# Patient Record
Sex: FEMALE | Race: WHITE | NOT HISPANIC OR LATINO | Employment: FULL TIME | URBAN - METROPOLITAN AREA
[De-identification: names, ages, dates, MRNs, and addresses within clinical notes are randomized per-mention and may not be internally consistent; named-entity substitution may affect disease eponyms.]

---

## 2020-08-20 ENCOUNTER — OFFICE VISIT (OUTPATIENT)
Dept: PODIATRY | Facility: CLINIC | Age: 55
End: 2020-08-20

## 2020-08-20 VITALS
BODY MASS INDEX: 22.99 KG/M2 | HEART RATE: 62 BPM | SYSTOLIC BLOOD PRESSURE: 130 MMHG | HEIGHT: 65 IN | DIASTOLIC BLOOD PRESSURE: 76 MMHG | WEIGHT: 138 LBS

## 2020-08-20 DIAGNOSIS — M79.674 GREAT TOE PAIN, RIGHT: Primary | ICD-10-CM

## 2020-08-20 DIAGNOSIS — L60.0 ONYCHOCRYPTOSIS: ICD-10-CM

## 2020-08-20 PROCEDURE — 11750 EXCISION NAIL&NAIL MATRIX: CPT | Performed by: PODIATRIST

## 2020-08-20 PROCEDURE — 99203 OFFICE O/P NEW LOW 30 MIN: CPT | Performed by: PODIATRIST

## 2020-08-20 RX ORDER — IBUPROFEN 400 MG/1
200 TABLET ORAL AS NEEDED
COMMUNITY

## 2020-08-20 RX ORDER — BUSPIRONE HYDROCHLORIDE 7.5 MG/1
20 TABLET ORAL
COMMUNITY
Start: 2018-09-24 | End: 2022-08-31

## 2020-08-20 NOTE — PROGRESS NOTES
08/20/2020  Foot and Ankle Surgery - New Patient   Provider: Dr. Moustapha Monae DPM  Location: [facility]    Subjective:  Aspen Way is a 55 y.o. female.     Chief Complaint   Patient presents with   • Right Foot - Nail Problem       HPI: Patient is a 55-year-old female that presents with issues involving her right great toenail.  She has been dealing with discomfort involving the medial border of the toenail as are intermittently noticed.  She states that discomfort is typically present with certain shoes and increased activity.  She has not had any significant redness or drainage.  She has dealt with intermittent issues similar to this for several years.  She has noticed gradual thickening of the toenail and incurvation involving the medial border.  She denies any injury to the toe.  She rates the symptoms a 7 out of 10 when noticed.    Allergies   Allergen Reactions   • Nitrofurantoin Dizziness   • Sulfa Antibiotics GI Intolerance       Past Medical History:   Diagnosis Date   • Kidney stone        Past Surgical History:   Procedure Laterality Date   • KIDNEY STONE SURGERY         Family History   Problem Relation Age of Onset   • Hypertension Mother        Social History     Socioeconomic History   • Marital status:      Spouse name: Not on file   • Number of children: Not on file   • Years of education: Not on file   • Highest education level: Not on file   Tobacco Use   • Smoking status: Never Smoker   Substance and Sexual Activity   • Alcohol use: No   • Drug use: Defer   • Sexual activity: Defer        Current Outpatient Medications on File Prior to Visit   Medication Sig Dispense Refill   • busPIRone (BUSPAR) 7.5 MG tablet 20 mg.     • ibuprofen (ADVIL,MOTRIN) 400 MG tablet Take 200 mg by mouth.       No current facility-administered medications on file prior to visit.        Review of Systems:  General: Denies fever, chills, fatigue, and weakness.  Eyes: Denies vision loss, blurry  "vision, and excessive redness.  ENT: Denies hearing issues and difficulty swallowing.  Cardiovascular: Denies palpitations, chest pain, or syncopal episodes.  Respiratory: Denies shortness of breath, wheezing, and coughing.  GI: Denies abdominal pain, nausea, and vomiting.   : Denies frequency, hematuria, and urgency.  Musculoskeletal: Denies muscle cramps, joint pains, and stiffness.  Derm: + Ingrown nail  Neuro: Denies headaches, numbness, loss of coordination, and tremors.  Psych: Denies anxiety and depression.  Endocrine: Denies temperature intolerance and changes in appetite.  Heme: Denies bleeding disorders or abnormal bruising.     Objective   /76   Pulse 62   Ht 165.1 cm (65\")   Wt 62.6 kg (138 lb)   BMI 22.96 kg/m²     Foot/Ankle Exam:       General:   Appearance: appears stated age and healthy    Orientation: AAOx3    Affect: appropriate    Gait: unimpaired      VASCULAR      Right Foot Vascularity   Normal vascular exam    Dorsalis pedis:  2+  Posterior tibial:  2+  Skin Temperature: warm    Edema Grading:  None  CFT:  < 3 seconds  Pedal Hair Growth:  Present  Varicosities: none        NEUROLOGIC     Right Foot Neurologic   Light touch sensation:  Normal  Hot/Cold sensation: normal    Achilles reflex:  2+     MUSCULOSKELETAL      Right Foot Musculoskeletal   Ecchymosis:  None  Tenderness: toe 1    Arch:  Normal     MUSCLE STRENGTH     Right Foot Muscle Strength   Normal strength    Foot dorsiflexion:  5  Foot plantar flexion:  5  Foot inversion:  5  Foot eversion:  5     DERMATOLOGIC     Right Foot Dermatologic   Skin: skin intact    Nails: abnormally thick, ingrown toenail and longitudinal ridges    Nails comment:  No significant erythema or drainage..  Discomfort with palpation to the medial border      Right Foot Additional Comments Moderate incurvation involving the medial border of the right great toe.  No signs of infection.  No gross deformity or instability.      Assessment/Plan "   Aspen was seen today for nail problem.    Diagnoses and all orders for this visit:    Great toe pain, right    Onychocryptosis      Patient presents with longstanding issues involving the right great toe.  Recently symptoms have progressed.  She complains of pain involving the medial border of the toe with increased activity and certain shoes.  After exam, findings are consistent with ingrown nail.  She has no signs of infection.  We did discuss treatment options including partial nail avulsion with chemical matrixectomy.  We did review the risks and benefits.  Procedure was performed without complication.  I have discussed the postprocedural instruction sheet.  She is to call with any issues or concerns.  I will see her as needed.    Partial Nail Avulsion with Chemical Matrixectomy: Right hallux    Informed consent was obtained prior to the procedure.  Right hallux was cleansed with alcohol and the digit was blocked in a ring block fashion utilizing 5 mL of 1% lidocaine plain.  A digital tourniquet was applied to the digit.  The medial border was lifted from the nail bed and nail margin with a Orlando.  An English Anvil was used to split the nail proximal to the eponychium extending into the nail matrix.  The offending nail margin was grasped with a hemostat and removed.  The nail border was explored with a curette to ensure adequate removal.  Matrixectomy was performed utilizing three 30 second applications of 89% phenol on a micro-tip applicator.  The area was then rinsed with alcohol to dilute the phenol. The nail fold and exposed nail bed were flushed with normal saline.  Antibiotic ointment followed by sterile compressive dressings were then applied.  The digital tourniquot was removed.  No excessive bleeding or complications were evident.  The patient tolerated procedure and local anesthetic well.      No orders of the defined types were placed in this encounter.       Note is dictated utilizing voice  recognition software. Unfortunately this leads to occasional typographical errors. I apologize in advance if the situation occurs. If questions occur please do not hesitate to call our office.

## 2020-08-20 NOTE — PATIENT INSTRUCTIONS
Ingrown Toenail  An ingrown toenail occurs when the corner or sides of a toenail grow into the surrounding skin. This causes discomfort and pain. The big toe is most commonly affected, but any of the toes can be affected. If an ingrown toenail is not treated, it can become infected.  What are the causes?  This condition may be caused by:  · Wearing shoes that are too small or tight.  · An injury, such as stubbing your toe or having your toe stepped on.  · Improper cutting or care of your toenails.  · Having nail or foot abnormalities that were present from birth (congenital abnormalities), such as having a nail that is too big for your toe.  What increases the risk?  The following factors may make you more likely to develop ingrown toenails:  · Age. Nails tend to get thicker with age, so ingrown nails are more common among older people.  · Cutting your toenails incorrectly, such as cutting them very short or cutting them unevenly.  An ingrown toenail is more likely to get infected if you have:  · Diabetes.  · Blood flow (circulation) problems.  What are the signs or symptoms?  Symptoms of an ingrown toenail may include:  · Pain, soreness, or tenderness.  · Redness.  · Swelling.  · Hardening of the skin that surrounds the toenail.  Signs that an ingrown toenail may be infected include:  · Fluid or pus.  · Symptoms that get worse instead of better.  How is this diagnosed?  An ingrown toenail may be diagnosed based on your medical history, your symptoms, and a physical exam. If you have fluid or blood coming from your toenail, a sample may be collected to test for the specific type of bacteria that is causing the infection.  How is this treated?  Treatment depends on how severe your ingrown toenail is. You may be able to care for your toenail at home.  · If you have an infection, you may be prescribed antibiotic medicines.  · If you have fluid or pus draining from your toenail, your health care provider may drain  it.  · If you have trouble walking, you may be given crutches to use.  · If you have a severe or infected ingrown toenail, you may need a procedure to remove part or all of the nail.  Follow these instructions at home:  Foot care    · Do not pick at your toenail or try to remove it yourself.  · Soak your foot in warm, soapy water. Do this for 20 minutes, 3 times a day, or as often as told by your health care provider. This helps to keep your toe clean and keep your skin soft.  · Wear shoes that fit well and are not too tight. Your health care provider may recommend that you wear open-toed shoes while you heal.  · Trim your toenails regularly and carefully. Cut your toenails straight across to prevent injury to the skin at the corners of the toenail. Do not cut your nails in a curved shape.  · Keep your feet clean and dry to help prevent infection.  Medicines  · Take over-the-counter and prescription medicines only as told by your health care provider.  · If you were prescribed an antibiotic, take it as told by your health care provider. Do not stop taking the antibiotic even if you start to feel better.  Activity  · Return to your normal activities as told by your health care provider. Ask your health care provider what activities are safe for you.  · Avoid activities that cause pain.  General instructions  · If your health care provider told you to use crutches to help you move around, use them as instructed.  · Keep all follow-up visits as told by your health care provider. This is important.  Contact a health care provider if:  · You have more redness, swelling, pain, or other symptoms that do not improve with treatment.  · You have fluid, blood, or pus coming from your toenail.  Get help right away if:  · You have a red streak on your skin that starts at your foot and spreads up your leg.  · You have a fever.  Summary  · An ingrown toenail occurs when the corner or sides of a toenail grow into the surrounding  skin. This causes discomfort and pain. The big toe is most commonly affected, but any of the toes can be affected.  · If an ingrown toenail is not treated, it can become infected.  · Fluid or pus draining from your toenail is a sign of infection. Your health care provider may need to drain it. You may be given antibiotics to treat the infection.  · Trimming your toenails regularly and properly can help you prevent an ingrown toenail.  This information is not intended to replace advice given to you by your health care provider. Make sure you discuss any questions you have with your health care provider.  Document Released: 12/15/2001 Document Revised: 04/10/2020 Document Reviewed: 09/05/2018  Elsevier Patient Education © 2020 [employer] Inc[employer]

## 2022-08-31 ENCOUNTER — OFFICE VISIT (OUTPATIENT)
Dept: ORTHOPEDIC SURGERY | Facility: CLINIC | Age: 57
End: 2022-08-31

## 2022-08-31 VITALS — WEIGHT: 139.8 LBS | HEART RATE: 63 BPM | BODY MASS INDEX: 23.29 KG/M2 | HEIGHT: 65 IN

## 2022-08-31 DIAGNOSIS — Z87.898 HISTORY OF PITUITARY TUMOR: ICD-10-CM

## 2022-08-31 DIAGNOSIS — Z87.442 HISTORY OF KIDNEY STONES: ICD-10-CM

## 2022-08-31 DIAGNOSIS — Z82.62 FAMILY HX OSTEOPOROSIS: ICD-10-CM

## 2022-08-31 DIAGNOSIS — M81.0 OSTEOPOROSIS WITHOUT CURRENT PATHOLOGICAL FRACTURE, UNSPECIFIED OSTEOPOROSIS TYPE: Primary | ICD-10-CM

## 2022-08-31 PROBLEM — R92.30 DENSE BREAST TISSUE ON MAMMOGRAM: Status: ACTIVE | Noted: 2019-11-14

## 2022-08-31 PROBLEM — R92.2 DENSE BREAST TISSUE ON MAMMOGRAM: Status: ACTIVE | Noted: 2019-11-14

## 2022-08-31 PROCEDURE — 99204 OFFICE O/P NEW MOD 45 MIN: CPT | Performed by: PHYSICIAN ASSISTANT

## 2022-08-31 RX ORDER — SIMETHICONE 125 MG
125 TABLET,CHEWABLE ORAL EVERY 6 HOURS PRN
COMMUNITY
End: 2022-12-05

## 2022-08-31 RX ORDER — MULTIVIT-MIN/IRON/FOLIC ACID/K 18-600-40
2000 CAPSULE ORAL DAILY
COMMUNITY

## 2022-08-31 NOTE — PATIENT INSTRUCTIONS
Osteoporosis    Osteoporosis is when the bones get thin and weak. This can cause your bones to break (fracture) more easily.  What are the causes?  The exact cause of this condition is not known.  What increases the risk?  Having family members with this condition.  Not eating enough healthy foods.  Taking certain medicines.  Being female.  Being age 50 or older.  Smoking or using other products that contain nicotine or tobacco, such as e-cigarettes or chewing tobacco.  Not exercising.  Being of  or  ancestry.  Having a small body frame.  What are the signs or symptoms?  A broken bone might be the first sign, especially if the break results from a fall or injury that usually would not cause a bone to break.  Other signs and symptoms include:  Pain in the neck or low back.  Being hunched over (stooped posture).  Getting shorter.  How is this treated?  Eating more foods with more calcium and vitamin D in them.  Doing exercises.  Stopping tobacco use.  Limiting how much alcohol you drink.  Taking medicines to slow bone loss or help make the bones stronger.  Taking supplements of calcium and vitamin D every day.  Taking medicines to replace chemicals in the body (hormone replacement medicines).  Monitoring your levels of calcium and vitamin D.  The goal of treatment is to strengthen your bones and lower your risk for a bone break.  Follow these instructions at home:  Eating and drinking  Eat plenty of calcium and vitamin D. These nutrients are good for your bones. Good sources of calcium and vitamin D include:  Some fish, such as salmon and tuna.  Foods that have calcium and vitamin D added to them (fortified foods), such as some breakfast cereals.  Egg yolks.  Cheese.  Liver.    Activity  Do exercises as told by your doctor. Ask your doctor what exercises are safe for you. You should do:  Exercises that make your muscles work to hold your body weight up (weight-bearing exercises). These include maryjane chi,  yoga, and walking.  Exercises to make your muscles stronger. One example is lifting weights.  Lifestyle  Do not drink alcohol if:  Your doctor tells you not to drink.  You are pregnant, may be pregnant, or are planning to become pregnant.  If you drink alcohol:  Limit how much you use to:  0-1 drink a day for women.  0-2 drinks a day for men.  Know how much alcohol is in your drink. In the [place] one drink equals one 12 oz bottle of beer (355 mL), one 5 oz glass of wine (148 mL), or one 1½ oz glass of hard liquor (44 mL).  Do not smoke or use any products that contain nicotine or tobacco. If you need help quitting, ask your doctor.  Preventing falls  Use tools to help you move around (mobility aids) as needed. These include canes, walkers, scooters, and crutches.  Keep rooms well-lit.  Put away things on the floor that could make you trip. These include cords and rugs.  Install safety rails on stairs. Install grab bars in bathrooms.  Use rubber mats in slippery areas, like bathrooms.  Wear shoes that:  Fit you well.  Support your feet.  Have closed toes.  Have rubber soles or low heels.  Tell your doctor about all of the medicines you are taking. Some medicines can make you more likely to fall.  General instructions  Take over-the-counter and prescription medicines only as told by your doctor.  Keep all follow-up visits.  Contact a doctor if:  You have not been tested (screened) for osteoporosis and you are:  A woman who is age 65 or older.  A man who is age 70 or older.  Get help right away if:  You fall.  You get hurt.  Summary  Osteoporosis happens when your bones get thin and weak.  Weak bones can break (fracture) more easily.  Eat plenty of calcium and vitamin D. These are good for your bones.  Tell your doctor about all of the medicines that you take.  This information is not intended to replace advice given to you by your health care provider. Make sure you discuss any questions you have with your health care  provider.  Document Revised: 06/03/2021 Document Reviewed: 06/03/2021  Elsevier Patient Education © 2021 Elsevier Inc.

## 2022-08-31 NOTE — PROGRESS NOTES
ORTHOPEDIC VISIT    Referring Provider: Kelly  Primary Care Provider: Richard Mendoza MD         Subjective:  Chief Complaint:  Chief Complaint   Patient presents with   • Osteoporosis     New Consult       HPI:  Aspen Way is a 57 y.o. female who presents for initial visit for osteoporosis.  The patient Complains of: back pain, loss of height and Joint pain.  And Denies: generalized bone pain and Any significant fractures over the age of 50.  Their risk factors include risk factors: low calcium intake, Elevated falls risk, Hypogonadism and Family history of osteoporosis.  The patient has the following Associated illnesses: Esophagitis, Gastritis and History of pituitary tumor.  Treatment to date has included Treatment to date: Vitamin D supplementation and Weightbearing exercise.  The patient has never taken any osteoporosis medications.  She currently does not take calcium but does take 2000 international units of D3 per day.  Fracture history over the age of 50 includes: None. Family history of osteoporosis includes: Mother, who was slightly kyphotic, and maternal grandmother, who was also kyphotic.  She reports menopause around the age of 53.  She does not smoke or drink alcohol.  Physical activity includes: Walking and recently started going to the [employer] / ?????.??.  She denies any history of steroid use, cancer, radiation treatment, heart attack, or stroke.  She has previously had 2 episodes of kidney stones, with the last one being approximately 8 years ago.  She does often have diarrhea.  She does have GERD and uses over-the-counter medications as needed.  She reports a history of pituitary tumor, which sounds like a prolactinoma.  She states that she took medicine for approximately 18 months and then no further treatment was needed.  She does not see a dentist regularly, but denies any mouth or jaw pain currently.  She has fallen in the last year, however does not feel unsteady with walking, and is not  "worried about falling.  She scored a 4 out of 14 on the STEADI risk for falling assessment.  She reports having multiple recent labs done with her PCP that are unavailable to me today.  Chart review completed today.  She did have a DEXA scan on 6/23/2022 at [facility] radiology, which revealed a T score of -3.0 in the spine, FRAX scores of 9.0% and 1.6%.  Referred for consultation by Dr. Mendoza.    STEADI Fall Risk Assessment was completed, and patient is at MODERATE risk for falls. Assessment completed on:8/31/2022    Past Medical History:   Diagnosis Date   • Kidney stone        Past Surgical History:   Procedure Laterality Date   • KIDNEY STONE SURGERY         Family History   Problem Relation Age of Onset   • Osteoporosis Mother    • Hypertension Mother    • Osteoporosis Maternal Grandmother        Social History     Occupational History   • Not on file   Tobacco Use   • Smoking status: Never Smoker   • Smokeless tobacco: Never Used   Vaping Use   • Vaping Use: Never used   Substance and Sexual Activity   • Alcohol use: No   • Drug use: Defer   • Sexual activity: Defer        Medications:    Current Outpatient Medications:   •  Cholecalciferol (Vitamin D) 50 MCG (2000 UT) capsule, Take 2,000 Units by mouth Daily. Not consistent per patient, Disp: , Rfl:   •  doxylamine (UNISOM) 25 MG tablet, Take 25 mg by mouth At Night As Needed for Sleep., Disp: , Rfl:   •  ibuprofen (ADVIL,MOTRIN) 400 MG tablet, Take 200 mg by mouth As Needed., Disp: , Rfl:   •  simethicone (MYLICON) 125 MG chewable tablet, Chew 125 mg Every 6 (Six) Hours As Needed., Disp: , Rfl:     Allergies:  Allergies   Allergen Reactions   • Nitrofurantoin Dizziness   • Sulfa Antibiotics GI Intolerance          Objective   Objective:    Pulse 63   Ht 165.1 cm (65\")   Wt 63.4 kg (139 lb 12.8 oz)   BMI 23.26 kg/m²     Physical Examination:  Well-nourished, well-developed individual in no acute distress, patient is alert and cooperative with the exam, " "appears to have normal mood and affect with a normal attention span and concentration, ambulating unassisted  normocephalic, atraumatic, extraocular movements intact, conjunctiva and sclera are clear, grossly normal hearing  no lymphadenopathy or thyromegaly  normal respiratory effort  abdomen is nontender, without guarding or rebound and nondistended  no gross joint abnormalities  no LE edema noted  skin intact without lesions or rashes visible         Imagin2022-DEXA scan at [facility] radiology, revealed a T score of -3.0 in the spine, -2.5 in the left femoral neck, -2.2 in the left total hip, with FRAX scores of 9.0% and 1.6%.            Assessment:  1. Osteoporosis without current pathological fracture, unspecified osteoporosis type    2. Family hx osteoporosis    3. History of pituitary tumor    4. History of kidney stones                 Plan:  I will request her most recent labs from her PCPs office for review.  She will likely need additional blood work to include bone turnover markers and a 24-hour urine for calcium and creatinine at the very least.  I do recommend 1200 mg of calcium daily, either through diet or supplementation, and she should continue her vitamin D.  According to her T score of -3.0, she is at very high risk for fracture.  She may be a candidate for anabolic treatment.  I will plan to see her back after the above has been completed, further recommendations will be pending.             GIDEON Goode  22  18:22 EDT    \"Please note that portions of this note were completed with a voice recognition program\".   "

## 2022-09-01 ENCOUNTER — TELEPHONE (OUTPATIENT)
Dept: ORTHOPEDIC SURGERY | Facility: CLINIC | Age: 57
End: 2022-09-01

## 2022-09-01 NOTE — TELEPHONE ENCOUNTER
----- Message from GIDEON Goode sent at 8/31/2022  6:33 PM EDT -----  Need recent labs from PCPs office

## 2022-09-01 NOTE — TELEPHONE ENCOUNTER
Call placed to Dr. Mendoza's office to request any recent labs done there faxing labs drawn on 8/12/2022.

## 2022-09-20 ENCOUNTER — TELEPHONE (OUTPATIENT)
Dept: ORTHOPEDIC SURGERY | Facility: CLINIC | Age: 57
End: 2022-09-20

## 2022-09-20 DIAGNOSIS — M81.0 OSTEOPOROSIS WITHOUT CURRENT PATHOLOGICAL FRACTURE, UNSPECIFIED OSTEOPOROSIS TYPE: Primary | ICD-10-CM

## 2022-09-20 DIAGNOSIS — Z87.442 HISTORY OF KIDNEY STONES: ICD-10-CM

## 2022-09-20 NOTE — TELEPHONE ENCOUNTER
Voicemail from patient checking to see if we had received the labs from her PCP office and if any additional labs were needed before she returned to discuss treatment options.    Call placed to patient, apologized for the delay and that I did have the labs from her PCP ready to be reviewed. Advised that however Farideh has been out sick so when she returns she will review the labs and we would be in touch regarding any additional labs needed. Okay per patient thanked us for calling.   Detail Level: Detailed Quality 110: Preventive Care And Screening: Influenza Immunization: Influenza immunization was not ordered or administered, reason not given Quality 130: Documentation Of Current Medications In The Medical Record: Current Medications Documented pt sitting at EOB with family present

## 2022-10-04 ENCOUNTER — LAB (OUTPATIENT)
Dept: LAB | Facility: HOSPITAL | Age: 57
End: 2022-10-04

## 2022-10-04 DIAGNOSIS — M81.0 OSTEOPOROSIS WITHOUT CURRENT PATHOLOGICAL FRACTURE, UNSPECIFIED OSTEOPOROSIS TYPE: ICD-10-CM

## 2022-10-04 DIAGNOSIS — Z87.442 HISTORY OF KIDNEY STONES: ICD-10-CM

## 2022-10-04 PROCEDURE — 36415 COLL VENOUS BLD VENIPUNCTURE: CPT

## 2022-10-04 PROCEDURE — 82523 COLLAGEN CROSSLINKS: CPT

## 2022-10-07 ENCOUNTER — LAB (OUTPATIENT)
Dept: LAB | Facility: HOSPITAL | Age: 57
End: 2022-10-07

## 2022-10-07 DIAGNOSIS — M81.0 OSTEOPOROSIS WITHOUT CURRENT PATHOLOGICAL FRACTURE, UNSPECIFIED OSTEOPOROSIS TYPE: ICD-10-CM

## 2022-10-07 DIAGNOSIS — Z87.442 HISTORY OF KIDNEY STONES: ICD-10-CM

## 2022-10-07 LAB
CALCIUM 24H UR-MCNC: 11.5 MG/DL
CALCIUM 24H UR-MRATE: 207 MG/24 HR (ref 100–300)
COLLAGEN CTX SERPL-MCNC: 454 PG/ML
COLLECT DURATION TIME UR: 24 HRS
COLLECT DURATION TIME UR: 24 HRS
CREAT UR-MCNC: 62.7 MG/DL
CREATINE 24H UR-MRATE: 1.13 G/24 HR (ref 0.7–1.6)
SPECIMEN VOL 24H UR: 1800 ML
SPECIMEN VOL 24H UR: 1800 ML

## 2022-10-07 PROCEDURE — 81050 URINALYSIS VOLUME MEASURE: CPT

## 2022-10-07 PROCEDURE — 82570 ASSAY OF URINE CREATININE: CPT

## 2022-10-07 PROCEDURE — 82340 ASSAY OF CALCIUM IN URINE: CPT

## 2022-10-14 LAB — PINP SER-MCNC: 83 UG/L

## 2022-11-07 ENCOUNTER — OFFICE VISIT (OUTPATIENT)
Dept: ORTHOPEDIC SURGERY | Facility: CLINIC | Age: 57
End: 2022-11-07

## 2022-11-07 ENCOUNTER — TELEPHONE (OUTPATIENT)
Dept: ORTHOPEDIC SURGERY | Facility: CLINIC | Age: 57
End: 2022-11-07

## 2022-11-07 VITALS — BODY MASS INDEX: 22.99 KG/M2 | HEIGHT: 65 IN | WEIGHT: 138 LBS | HEART RATE: 70 BPM

## 2022-11-07 DIAGNOSIS — M81.0 OSTEOPOROSIS WITHOUT CURRENT PATHOLOGICAL FRACTURE, UNSPECIFIED OSTEOPOROSIS TYPE: Primary | ICD-10-CM

## 2022-11-07 DIAGNOSIS — Z87.898 HISTORY OF PITUITARY TUMOR: ICD-10-CM

## 2022-11-07 DIAGNOSIS — Z82.62 FAMILY HX OSTEOPOROSIS: ICD-10-CM

## 2022-11-07 DIAGNOSIS — Z87.442 HISTORY OF KIDNEY STONES: ICD-10-CM

## 2022-11-07 PROCEDURE — 99214 OFFICE O/P EST MOD 30 MIN: CPT | Performed by: PHYSICIAN ASSISTANT

## 2022-11-07 RX ORDER — ABALOPARATIDE 2000 UG/ML
80 INJECTION, SOLUTION SUBCUTANEOUS DAILY
Qty: 1 ML | Refills: 0 | COMMUNITY
Start: 2022-11-07 | End: 2022-11-14 | Stop reason: SDUPTHER

## 2022-11-07 NOTE — PROGRESS NOTES
ORTHO FOLLOW UP       Subjective:    HPI:   Aspen Way is a 57 y.o. female who presents in follow-up for osteoporosis.  Recent lab work was reviewed with the patient today.  She is currently taking 700 mg of calcium +3000 international units of D3 per day.  From last OV:  Aspen Way is a 57 y.o. female who presents for initial visit for osteoporosis.  The patient Complains of: back pain, loss of height and Joint pain.  And Denies: generalized bone pain and Any significant fractures over the age of 50.  Their risk factors include risk factors: low calcium intake, Elevated falls risk, Hypogonadism and Family history of osteoporosis.  The patient has the following Associated illnesses: Esophagitis, Gastritis and History of pituitary tumor.  Treatment to date has included Treatment to date: Vitamin D supplementation and Weightbearing exercise.  The patient has never taken any osteoporosis medications.  She currently does not take calcium but does take 2000 international units of D3 per day.  Fracture history over the age of 50 includes: None. Family history of osteoporosis includes: Mother, who was slightly kyphotic, and maternal grandmother, who was also kyphotic.  She reports menopause around the age of 53.  She does not smoke or drink alcohol.  Physical activity includes: Walking [facility]  She denies any history of steroid use, cancer, radiation treatment, heart attack, or stroke.  She has previously had 2 episodes of kidney stones, with the last one being approximately 8 years ago.  She does often have diarrhea.  She does have GERD and uses over-the-counter medications as needed.  She reports a history of pituitary tumor, which sounds like a prolactinoma.  She states that she took medicine for approximately 18 months and then no further treatment was needed.  She does not see a dentist regularly, but denies any mouth or jaw pain currently.  She has fallen in the last year,  however does not feel unsteady with walking, and is not worried about falling.  She scored a 4 out of 14 on the STEADI risk for falling assessment.  She reports having multiple recent labs done with her PCP that are unavailable to me today.  Chart review completed today.  She did have a DEXA scan on 6/23/2022 at [facility] radiology, which revealed a T score of -3.0 in the spine, FRAX scores of 9.0% and 1.6%.  Referred for consultation by Dr. Mendoza.     STEADI Fall Risk Assessment was completed, and patient is at MODERATE risk for falls. Assessment completed on:8/31/2022      Past Medical History:   Diagnosis Date   • Kidney stone    • Osteoporosis     on tymlos(11/7/22)       Past Surgical History:   Procedure Laterality Date   • KIDNEY STONE SURGERY         Social History     Occupational History   • Not on file   Tobacco Use   • Smoking status: Never   • Smokeless tobacco: Never   Vaping Use   • Vaping Use: Never used   Substance and Sexual Activity   • Alcohol use: No   • Drug use: Defer   • Sexual activity: Defer      The following portions of the patient's history were reviewed and updated as appropriate: allergies, current medications, past family history, past medical history, past social history, past surgical history and problem list.    Medications:    Current Outpatient Medications:   •  Cholecalciferol (Vitamin D) 50 MCG (2000 UT) capsule, Take 2,000 Units by mouth Daily. Not consistent per patient, Disp: , Rfl:   •  doxylamine (UNISOM) 25 MG tablet, Take 25 mg by mouth At Night As Needed for Sleep., Disp: , Rfl:   •  ibuprofen (ADVIL,MOTRIN) 400 MG tablet, Take 200 mg by mouth As Needed., Disp: , Rfl:   •  NON FORMULARY, 2 tablets 2 (Two) Times a Day. Bone formula vitamin, Disp: , Rfl:   •  simethicone (MYLICON) 125 MG chewable tablet, Chew 125 mg Every 6 (Six) Hours As Needed., Disp: , Rfl:   •  Abaloparatide (Tymlos) 3120 MCG/1.56ML solution pen-injector, Inject 0.04 mL under the skin into the  "appropriate area as directed Daily., Disp: 1 mL, Rfl: 0    Allergies:  Allergies   Allergen Reactions   • Nitrofurantoin Dizziness   • Sulfa Antibiotics GI Intolerance          Objective   Objective:    Pulse 70   Ht 165.1 cm (65\")   Wt 62.6 kg (138 lb)   BMI 22.96 kg/m²     Physical Examination:  Well-nourished, well-developed individual in no acute distress, patient is alert and cooperative with the exam, appears to have normal mood and affect with a normal attention span and concentration, ambulating unassisted  normocephalic, atraumatic, extraocular movements intact, conjunctiva and sclera are clear, grossly normal hearing  no lymphadenopathy or thyromegaly  normal respiratory effort  abdomen is nontender, without guarding or rebound and nondistended  no gross joint abnormalities  no LE edema noted  skin intact without lesions or rashes visible         Imagin2022-DEXA scan at [facility] radiology, revealed a T score of -3.0 in the spine, -2.5 in the left femoral neck, -2.2 in the left total hip, with FRAX scores of 9.0% and 1.6%.  10/4/3756-C6AS-09; CTX-454  10/7/2022-urinary calcium-207.0  2022-patient started on tymlos          Assessment:  1. Osteoporosis without current pathological fracture, unspecified osteoporosis type    2. Family hx osteoporosis    3. History of pituitary tumor    4. History of kidney stones                 Plan:  According to her T score of -3.0, she is at very high risk for fracture.  She should continue her calcium and vitamin D.  At this time, I have recommended treatment with tymlos.  We did discuss the risks and benefits of this medication today, including its previous black box warning of osteosarcoma, and increased risk of kidney stones.  She voiced understanding and would like to proceed.  She will be started today with a sample pen and starter pack.  She will be given an osteoporosis folder today containing everything discussed, including weightbearing exercises " "and fall prevention.  She will work with her daughter who is a physical therapist, for physical therapy.  I will plan to see her back in 4 weeks for recheck.  She should call with any questions or concerns.  PATIENT EDUCATION:    Education was provided to: patient  Patient response: expressed understanding and receptive  Topics discussed: medical condition, workup results, treatment options, therapeutic risks and benefits, medication use, health promotion, diet and nutrition, support systems available, drug interactions, compliance with medication, osteoporosis risks, tymlos, injection training  Informed how: verbally, demonstration, literature               GIDEON Goode  11/07/22  09:28 EST    \"Please note that portions of this note were completed with a voice recognition program\".   "

## 2022-11-07 NOTE — PATIENT INSTRUCTIONS
Osteoporosis  Osteoporosis is when the bones get thin and weak. This can cause your bones to break (fracture) more easily.  What are the causes?  The exact cause of this condition is not known.  What increases the risk?  Having family members with this condition.  Not eating enough healthy foods.  Taking certain medicines.  Being female.  Being age 50 or older.  Smoking or using other products that contain nicotine or tobacco, such as e-cigarettes or chewing tobacco.  Not exercising.  Being of  or  ancestry.  Having a small body frame.  What are the signs or symptoms?  A broken bone might be the first sign, especially if the break results from a fall or injury that usually would not cause a bone to break.  Other signs and symptoms include:  Pain in the neck or low back.  Being hunched over (stooped posture).  Getting shorter.  How is this treated?  Eating more foods with more calcium and vitamin D in them.  Doing exercises.  Stopping tobacco use.  Limiting how much alcohol you drink.  Taking medicines to slow bone loss or help make the bones stronger.  Taking supplements of calcium and vitamin D every day.  Taking medicines to replace chemicals in the body (hormone replacement medicines).  Monitoring your levels of calcium and vitamin D.  The goal of treatment is to strengthen your bones and lower your risk for a bone break.  Follow these instructions at home:  Eating and drinking  Eat plenty of calcium and vitamin D. These nutrients are good for your bones. Good sources of calcium and vitamin D include:  Some fish, such as salmon and tuna.  Foods that have calcium and vitamin D added to them (fortified foods), such as some breakfast cereals.  Egg yolks.  Cheese.  Liver.    Activity  Do exercises as told by your doctor. Ask your doctor what exercises are safe for you. You should do:  Exercises that make your muscles work to hold your body weight up (weight-bearing exercises). These include maryjane chi,  yoga, and walking.  Exercises to make your muscles stronger. One example is lifting weights.  Lifestyle  Do not drink alcohol if:  Your doctor tells you not to drink.  You are pregnant, may be pregnant, or are planning to become pregnant.  If you drink alcohol:  Limit how much you use to:  0-1 drink a day for women.  0-2 drinks a day for men.  Know how much alcohol is in your drink. In the [place] one drink equals one 12 oz bottle of beer (355 mL), one 5 oz glass of wine (148 mL), or one 1½ oz glass of hard liquor (44 mL).  Do not smoke or use any products that contain nicotine or tobacco. If you need help quitting, ask your doctor.  Preventing falls  Use tools to help you move around (mobility aids) as needed. These include canes, walkers, scooters, and crutches.  Keep rooms well-lit.  Put away things on the floor that could make you trip. These include cords and rugs.  Install safety rails on stairs. Install grab bars in bathrooms.  Use rubber mats in slippery areas, like bathrooms.  Wear shoes that:  Fit you well.  Support your feet.  Have closed toes.  Have rubber soles or low heels.  Tell your doctor about all of the medicines you are taking. Some medicines can make you more likely to fall.  General instructions  Take over-the-counter and prescription medicines only as told by your doctor.  Keep all follow-up visits.  Contact a doctor if:  You have not been tested (screened) for osteoporosis and you are:  A woman who is age 65 or older.  A man who is age 70 or older.  Get help right away if:  You fall.  You get hurt.  Summary  Osteoporosis happens when your bones get thin and weak.  Weak bones can break (fracture) more easily.  Eat plenty of calcium and vitamin D. These are good for your bones.  Tell your doctor about all of the medicines that you take.  This information is not intended to replace advice given to you by your health care provider. Make sure you discuss any questions you have with your health care  provider.  Document Revised: 06/03/2021 Document Reviewed: 06/03/2021  Elsevier Patient Education © 2022 [employer] Inc[employer]

## 2022-11-08 NOTE — TELEPHONE ENCOUNTER
Notice of denial received from [facility] for Tymlos. Letter scanned to patient's chart. Thank you.

## 2022-11-09 ENCOUNTER — TELEPHONE (OUTPATIENT)
Dept: ORTHOPEDIC SURGERY | Facility: CLINIC | Age: 57
End: 2022-11-09

## 2022-11-09 RX ORDER — ONDANSETRON 4 MG/1
4 TABLET, FILM COATED ORAL EVERY 8 HOURS PRN
Qty: 20 TABLET | Refills: 0 | Status: SHIPPED | OUTPATIENT
Start: 2022-11-09

## 2022-11-09 NOTE — TELEPHONE ENCOUNTER
Voicemail from patient, states she started the Tymlos last night and had extreme nausea, wrenching, that started 2 hours after the injection. States she had Zofran at home that helped some but it stayed pretty extreme all night. States she is concerned about taking this again without having any anti-nausea medication on hand. Wanted to know if she needed to have a prescription for this until her body gets used to the medication. Please advise. Thank you.

## 2022-11-10 ENCOUNTER — TELEPHONE (OUTPATIENT)
Dept: ORTHOPEDIC SURGERY | Facility: CLINIC | Age: 57
End: 2022-11-10

## 2022-11-10 NOTE — TELEPHONE ENCOUNTER
Caller: JERRELL FONSECA    Relationship: SELF    Best call back number:     What is the best time to reach you: ANYTIME    Who are you requesting to speak with (clinical staff, provider,  specific staff member): MICKIE LEE    Do you know the name of the person who called: MICKIE LEE    What was the call regarding: THE PATIENT IS RETURNING MICKIE LEE'S CALL.    Do you require a callback: YES

## 2022-11-10 NOTE — TELEPHONE ENCOUNTER
Left a message to see how patient was doing with the tymlos before I wrote an appeals letter.    Spoke with patient, she is doing better with side effects, so we will proceed with the appeal letter.

## 2022-11-14 ENCOUNTER — TELEPHONE (OUTPATIENT)
Dept: ORTHOPEDIC SURGERY | Facility: CLINIC | Age: 57
End: 2022-11-14

## 2022-11-14 DIAGNOSIS — M81.0 OSTEOPOROSIS WITHOUT CURRENT PATHOLOGICAL FRACTURE, UNSPECIFIED OSTEOPOROSIS TYPE: ICD-10-CM

## 2022-11-14 RX ORDER — PEN NEEDLE, DIABETIC 30 GX3/16"
5 NEEDLE, DISPOSABLE MISCELLANEOUS DAILY
Qty: 100 EACH | Refills: 1 | Status: SHIPPED | OUTPATIENT
Start: 2022-11-14

## 2022-11-14 RX ORDER — ABALOPARATIDE 2000 UG/ML
80 INJECTION, SOLUTION SUBCUTANEOUS DAILY
Qty: 1.56 ML | Refills: 5 | Status: SHIPPED | OUTPATIENT
Start: 2022-11-14

## 2022-11-14 NOTE — TELEPHONE ENCOUNTER
[employer] notification of approval of [employer] Authorization. [employer] Appeal has been approved, Auth # [id number], good until 11/11/2024.    Tymlos and pen needles e-scribed to [facility] pharmacy.

## 2022-12-05 ENCOUNTER — OFFICE VISIT (OUTPATIENT)
Dept: ORTHOPEDIC SURGERY | Facility: CLINIC | Age: 57
End: 2022-12-05

## 2022-12-05 VITALS
WEIGHT: 138 LBS | HEART RATE: 65 BPM | OXYGEN SATURATION: 98 % | SYSTOLIC BLOOD PRESSURE: 123 MMHG | HEIGHT: 65 IN | BODY MASS INDEX: 22.99 KG/M2 | DIASTOLIC BLOOD PRESSURE: 77 MMHG

## 2022-12-05 DIAGNOSIS — Z51.81 MEDICATION MONITORING ENCOUNTER: ICD-10-CM

## 2022-12-05 DIAGNOSIS — Z87.898 HISTORY OF PITUITARY TUMOR: ICD-10-CM

## 2022-12-05 DIAGNOSIS — Z82.62 FAMILY HX OSTEOPOROSIS: ICD-10-CM

## 2022-12-05 DIAGNOSIS — Z87.442 HISTORY OF KIDNEY STONES: ICD-10-CM

## 2022-12-05 DIAGNOSIS — M81.0 OSTEOPOROSIS WITHOUT CURRENT PATHOLOGICAL FRACTURE, UNSPECIFIED OSTEOPOROSIS TYPE: Primary | ICD-10-CM

## 2022-12-05 PROCEDURE — 99214 OFFICE O/P EST MOD 30 MIN: CPT | Performed by: PHYSICIAN ASSISTANT

## 2022-12-05 NOTE — PROGRESS NOTES
ORTHO FOLLOW UP       Subjective:    HPI:   Aspen Way is a 57 y.o. female who presents in follow-up for osteoporosis.  She is currently on tymlos and reports that her severe nausea has improved significantly but she does report that she does not feel well for a few hours after taking the injection.  She does report that her hair started falling out but she is not sure if it is connected to the tymlos.  This medication was started on 11/7/2022.  She also continues 700 mg of calcium +10,000 international units of D3 per day.  She just recently increased her vitamin D due to the increase in colds and flu in the area.  She denies any new fractures since last office visit.  She is walking for physical activity.  She denies any falls since last office visit.  She denies any new pain or change in her existing pain.  Recent labs reviewed.  Her DEXA scan is currently up-to-date.      Past Medical History:   Diagnosis Date   • Kidney stone    • Osteoporosis     on tymlos(11/7/22)       Past Surgical History:   Procedure Laterality Date   • KIDNEY STONE SURGERY         Social History     Occupational History   • Not on file   Tobacco Use   • Smoking status: Never   • Smokeless tobacco: Never   Vaping Use   • Vaping Use: Never used   Substance and Sexual Activity   • Alcohol use: No   • Drug use: Defer   • Sexual activity: Defer      The following portions of the patient's history were reviewed and updated as appropriate: allergies, current medications, past family history, past medical history, past social history, past surgical history and problem list.    Medications:    Current Outpatient Medications:   •  Abaloparatide (Tymlos) 3120 MCG/1.56ML solution pen-injector, Inject 0.04 mL under the skin into the appropriate area as directed Daily., Disp: 1.56 mL, Rfl: 5  •  Cholecalciferol (Vitamin D) 50 MCG (2000 UT) capsule, Take 2,000 Units by mouth Daily. Not consistent per patient, Disp: , Rfl:   •  doxylamine  "(UNISOM) 25 MG tablet, Take 25 mg by mouth At Night As Needed for Sleep., Disp: , Rfl:   •  ibuprofen (ADVIL,MOTRIN) 400 MG tablet, Take 200 mg by mouth As Needed., Disp: , Rfl:   •  Insulin Pen Needle (Pen Needles) 31G X 5 MM misc, 5 mm Daily., Disp: 100 each, Rfl: 1  •  NON FORMULARY, 2 tablets 2 (Two) Times a Day. Bone formula vitamin, Disp: , Rfl:   •  ondansetron (Zofran) 4 MG tablet, Take 1 tablet by mouth Every 8 (Eight) Hours As Needed for Nausea or Vomiting., Disp: 20 tablet, Rfl: 0    Allergies:  Allergies   Allergen Reactions   • Nitrofurantoin Dizziness   • Sulfa Antibiotics GI Intolerance          Objective   Objective:    /77   Pulse 65   Ht 165.1 cm (65\")   Wt 62.6 kg (138 lb)   SpO2 98%   BMI 22.96 kg/m²     Physical Examination:  Well-nourished, well-developed individual in no acute distress, patient is alert and cooperative with the exam, appears to have normal mood and affect with a normal attention span and concentration, ambulating unassisted  normocephalic, atraumatic, extraocular movements intact, conjunctiva and sclera are clear, grossly normal hearing  no lymphadenopathy or thyromegaly  normal respiratory effort  abdomen is nontender, without guarding or rebound and nondistended  no gross joint abnormalities  no LE edema noted  skin intact without lesions or rashes visible         [facility], revealed a T score of -3.0 in the spine, -2.5 in the left femoral neck, -2.2 in the left total hip, with FRAX scores of 9.0% and 1.6%.  10/4/6858-A4AS-79; CTX-454  10/7/2022-urinary calcium-207.0  2022-patient started on tymlos          Assessment:  1. Osteoporosis without current pathological fracture, unspecified osteoporosis type    2. Family hx osteoporosis    3. History of pituitary tumor    4. History of kidney stones    5. Medication monitoring encounter         Currently on tymlos since 2022        Plan:  At this time, I would like to " "check a CMP, bone turnover markers, and vitamin D level.  I have recommended that she decrease her vitamin D to 3000 international units/day.  She should continue her calcium. According to her T score of -3.0, she is at very high risk for fracture.  Her side effects seem to be improving and she would like to continue the tymlos for now.  She will call me if she changes her mind.  We did discuss alternative treatment options today, including trying Forteo or switching to Prolia.  Evenity would also be an option.  She should continue her daily tymlos injections.  Since I am leaving the practice, she should follow-up with her PCP in 5 months with repeat labs.  She will need a new DEXA scan after she has been on the tymlos for 1 year.  It is important that she start an antiresorptive after she completes the tymlos.  Continue weightbearing exercises and fall prevention.  She should call with any questions or concerns.               GIDEON Goode  12/05/22  09:05 EST    \"Please note that portions of this note were completed with a voice recognition program\".   "

## 2022-12-13 ENCOUNTER — LAB (OUTPATIENT)
Dept: LAB | Facility: HOSPITAL | Age: 57
End: 2022-12-13

## 2022-12-13 DIAGNOSIS — Z82.62 FAMILY HX OSTEOPOROSIS: ICD-10-CM

## 2022-12-13 DIAGNOSIS — M81.0 OSTEOPOROSIS WITHOUT CURRENT PATHOLOGICAL FRACTURE, UNSPECIFIED OSTEOPOROSIS TYPE: ICD-10-CM

## 2022-12-13 DIAGNOSIS — Z51.81 MEDICATION MONITORING ENCOUNTER: ICD-10-CM

## 2022-12-13 LAB
25(OH)D3 SERPL-MCNC: 46.1 NG/ML (ref 30–100)
ALBUMIN SERPL-MCNC: 4.4 G/DL (ref 3.5–5.2)
ALBUMIN/GLOB SERPL: 1.8 G/DL
ALP SERPL-CCNC: 90 U/L (ref 39–117)
ALT SERPL W P-5'-P-CCNC: 6 U/L (ref 1–33)
ANION GAP SERPL CALCULATED.3IONS-SCNC: 7.7 MMOL/L (ref 5–15)
AST SERPL-CCNC: 14 U/L (ref 1–32)
BILIRUB SERPL-MCNC: 0.3 MG/DL (ref 0–1.2)
BUN SERPL-MCNC: 17 MG/DL (ref 6–20)
BUN/CREAT SERPL: 22.7 (ref 7–25)
CALCIUM SPEC-SCNC: 9.3 MG/DL (ref 8.6–10.5)
CHLORIDE SERPL-SCNC: 101 MMOL/L (ref 98–107)
CO2 SERPL-SCNC: 29.3 MMOL/L (ref 22–29)
CREAT SERPL-MCNC: 0.75 MG/DL (ref 0.57–1)
EGFRCR SERPLBLD CKD-EPI 2021: 93 ML/MIN/1.73
GLOBULIN UR ELPH-MCNC: 2.4 GM/DL
GLUCOSE SERPL-MCNC: 83 MG/DL (ref 65–99)
POTASSIUM SERPL-SCNC: 4.1 MMOL/L (ref 3.5–5.2)
PROT SERPL-MCNC: 6.8 G/DL (ref 6–8.5)
SODIUM SERPL-SCNC: 138 MMOL/L (ref 136–145)

## 2022-12-13 PROCEDURE — 80053 COMPREHEN METABOLIC PANEL: CPT

## 2022-12-13 PROCEDURE — 82523 COLLAGEN CROSSLINKS: CPT

## 2022-12-13 PROCEDURE — 36415 COLL VENOUS BLD VENIPUNCTURE: CPT

## 2022-12-13 PROCEDURE — 82306 VITAMIN D 25 HYDROXY: CPT

## 2022-12-18 LAB — COLLAGEN CTX SERPL-MCNC: 682 PG/ML

## 2022-12-20 LAB — PINP SER-MCNC: 184 UG/L

## 2024-03-19 NOTE — TELEPHONE ENCOUNTER
Call placed to patient advised that Farideh had sent in the Zofran 4mg to her pharmacy. Patient wanted to know if she should wait to see how she does. Advised it is up to her but if she was that miserable would say take the Zofran when she takes the Tymlos. Patient expressed understanding of dosage and frequency of medication.   no 154.9